# Patient Record
Sex: MALE | Race: WHITE | Employment: FULL TIME | URBAN - METROPOLITAN AREA
[De-identification: names, ages, dates, MRNs, and addresses within clinical notes are randomized per-mention and may not be internally consistent; named-entity substitution may affect disease eponyms.]

---

## 2023-05-08 NOTE — H&P
Patient seen and examined. No interim changes in H&P. Plan PCI  Cx. Risks, benefits and alternatives (including but not limited to death, MI, CVA, bleeding, RADHA, medical management) discussed. Questions answered. Patient expresses understanding and wishes to proceed.

## 2023-05-11 RX ORDER — RANOLAZINE 500 MG/1
500 TABLET, EXTENDED RELEASE ORAL 2 TIMES DAILY
COMMUNITY

## 2023-05-11 RX ORDER — FLUTICASONE FUROATE, UMECLIDINIUM BROMIDE AND VILANTEROL TRIFENATATE 200; 62.5; 25 UG/1; UG/1; UG/1
1 POWDER RESPIRATORY (INHALATION) DAILY
COMMUNITY

## 2023-05-11 RX ORDER — TAMSULOSIN HYDROCHLORIDE 0.4 MG/1
0.4 CAPSULE ORAL DAILY
COMMUNITY

## 2023-05-11 RX ORDER — CLOPIDOGREL BISULFATE 75 MG/1
75 TABLET ORAL DAILY
COMMUNITY

## 2023-05-11 RX ORDER — METOPROLOL SUCCINATE 25 MG/1
25 TABLET, EXTENDED RELEASE ORAL DAILY
COMMUNITY

## 2023-05-11 RX ORDER — GUAIFENESIN 600 MG/1
1200 TABLET, EXTENDED RELEASE ORAL 2 TIMES DAILY PRN
COMMUNITY

## 2023-05-11 RX ORDER — ESOMEPRAZOLE MAGNESIUM 40 MG/1
40 CAPSULE, DELAYED RELEASE ORAL
COMMUNITY

## 2023-05-11 RX ORDER — MELATONIN
325
COMMUNITY

## 2023-05-11 RX ORDER — ATORVASTATIN CALCIUM 80 MG/1
80 TABLET, FILM COATED ORAL NIGHTLY
COMMUNITY

## 2023-05-11 RX ORDER — VARENICLINE TARTRATE 1 MG/1
1 TABLET, FILM COATED ORAL 2 TIMES DAILY
COMMUNITY

## 2023-05-12 ENCOUNTER — APPOINTMENT (OUTPATIENT)
Dept: GENERAL RADIOLOGY | Facility: HOSPITAL | Age: 58
End: 2023-05-12
Attending: INTERNAL MEDICINE
Payer: COMMERCIAL

## 2023-05-12 ENCOUNTER — HOSPITAL ENCOUNTER (OUTPATIENT)
Dept: INTERVENTIONAL RADIOLOGY/VASCULAR | Facility: HOSPITAL | Age: 58
Discharge: HOME | End: 2023-05-12
Attending: INTERNAL MEDICINE | Admitting: INTERNAL MEDICINE
Payer: COMMERCIAL

## 2023-05-12 VITALS
BODY MASS INDEX: 27.28 KG/M2 | WEIGHT: 180 LBS | HEIGHT: 68 IN | HEART RATE: 80 BPM | SYSTOLIC BLOOD PRESSURE: 106 MMHG | RESPIRATION RATE: 18 BRPM | DIASTOLIC BLOOD PRESSURE: 66 MMHG | OXYGEN SATURATION: 97 %

## 2023-05-12 DIAGNOSIS — I25.10 CHRONIC TOTAL OCCLUSION OF NATIVE CORONARY ARTERY: ICD-10-CM

## 2023-05-12 DIAGNOSIS — I25.82 CHRONIC TOTAL OCCLUSION OF NATIVE CORONARY ARTERY: ICD-10-CM

## 2023-05-12 LAB — ISTAT ACTIVATED CLOTTING TIME: 323 SECONDS (ref 74–137)

## 2023-05-12 PROCEDURE — 92979 ENDOLUMINL IVUS OCT C EA: CPT | Performed by: INTERNAL MEDICINE

## 2023-05-12 PROCEDURE — 93010 ELECTROCARDIOGRAM REPORT: CPT | Performed by: INTERNAL MEDICINE

## 2023-05-12 PROCEDURE — 71045 X-RAY EXAM CHEST 1 VIEW: CPT | Performed by: INTERNAL MEDICINE

## 2023-05-12 PROCEDURE — 99153 MOD SED SAME PHYS/QHP EA: CPT | Performed by: INTERNAL MEDICINE

## 2023-05-12 PROCEDURE — 92978 ENDOLUMINL IVUS OCT C 1ST: CPT | Performed by: INTERNAL MEDICINE

## 2023-05-12 PROCEDURE — 02703EZ DILATION OF CORONARY ARTERY, ONE ARTERY WITH TWO INTRALUMINAL DEVICES, PERCUTANEOUS APPROACH: ICD-10-PCS | Performed by: INTERNAL MEDICINE

## 2023-05-12 PROCEDURE — 85347 COAGULATION TIME ACTIVATED: CPT

## 2023-05-12 PROCEDURE — 99152 MOD SED SAME PHYS/QHP 5/>YRS: CPT | Performed by: INTERNAL MEDICINE

## 2023-05-12 PROCEDURE — 99211 OFF/OP EST MAY X REQ PHY/QHP: CPT

## 2023-05-12 PROCEDURE — B241ZZ3 ULTRASONOGRAPHY OF MULTIPLE CORONARY ARTERIES, INTRAVASCULAR: ICD-10-PCS | Performed by: INTERNAL MEDICINE

## 2023-05-12 PROCEDURE — 93005 ELECTROCARDIOGRAM TRACING: CPT

## 2023-05-12 RX ORDER — ASPIRIN 81 MG/1
81 TABLET ORAL DAILY
Status: DISCONTINUED | OUTPATIENT
Start: 2023-05-13 | End: 2023-05-12

## 2023-05-12 RX ORDER — CLOPIDOGREL BISULFATE 75 MG/1
75 TABLET ORAL DAILY
Status: DISCONTINUED | OUTPATIENT
Start: 2023-05-13 | End: 2023-05-12

## 2023-05-12 RX ORDER — HEPARIN SODIUM 5000 [USP'U]/ML
INJECTION, SOLUTION INTRAVENOUS; SUBCUTANEOUS
Status: COMPLETED
Start: 2023-05-12 | End: 2023-05-12

## 2023-05-12 RX ORDER — LIDOCAINE HYDROCHLORIDE 10 MG/ML
INJECTION, SOLUTION EPIDURAL; INFILTRATION; INTRACAUDAL; PERINEURAL
Status: COMPLETED
Start: 2023-05-12 | End: 2023-05-12

## 2023-05-12 RX ORDER — IODIXANOL 320 MG/ML
100 INJECTION, SOLUTION INTRAVASCULAR
Status: DISCONTINUED | OUTPATIENT
Start: 2023-05-12 | End: 2023-05-12

## 2023-05-12 RX ORDER — ASPIRIN 81 MG/1
TABLET, CHEWABLE ORAL
Status: COMPLETED
Start: 2023-05-12 | End: 2023-05-12

## 2023-05-12 RX ORDER — SODIUM CHLORIDE 9 MG/ML
500 INJECTION, SOLUTION INTRAVENOUS
Status: DISCONTINUED | OUTPATIENT
Start: 2023-05-13 | End: 2023-05-12 | Stop reason: HOSPADM

## 2023-05-12 RX ORDER — IODIXANOL 320 MG/ML
100 INJECTION, SOLUTION INTRAVASCULAR
Status: COMPLETED | OUTPATIENT
Start: 2023-05-12 | End: 2023-05-12

## 2023-05-12 RX ORDER — MIDAZOLAM HYDROCHLORIDE 1 MG/ML
INJECTION INTRAMUSCULAR; INTRAVENOUS
Status: COMPLETED
Start: 2023-05-12 | End: 2023-05-12

## 2023-05-12 RX ORDER — SODIUM CHLORIDE 9 MG/ML
INJECTION, SOLUTION INTRAVENOUS CONTINUOUS
Status: DISCONTINUED | OUTPATIENT
Start: 2023-05-12 | End: 2023-05-12

## 2023-05-12 RX ORDER — ASPIRIN 81 MG/1
81 TABLET, CHEWABLE ORAL ONCE
Status: COMPLETED | OUTPATIENT
Start: 2023-05-12 | End: 2023-05-12

## 2023-05-12 RX ADMIN — ASPIRIN 81 MG: 81 TABLET, CHEWABLE ORAL at 09:45:00

## 2023-05-12 RX ADMIN — IODIXANOL 100 ML: 320 INJECTION, SOLUTION INTRAVASCULAR at 13:17:00

## 2023-05-12 RX ADMIN — SODIUM CHLORIDE: 9 INJECTION, SOLUTION INTRAVENOUS at 14:00:00

## 2023-05-12 NOTE — PLAN OF CARE
Patient had PCI today with Dr. Aly Oadebby. Bilateral groin sites CDI, soft, no hematoma, +1 pedal pulses. Patient denies any pain. VSS. Patient's niece @ bedside. Dr. Aly Oar @ bedside post procedure. Ekg completed. Patient tolerating po intake. Bedrest completed. Patient ambulated to BR and in halls, tolerated well. Groins stable. Cardiac rehab saw patient. Discharge instructions reviewed. IV's D/C'd. Patient discharged to [facility] by wheelchair with belongings. Patient's niece is .

## 2023-05-12 NOTE — CARDIAC REHAB
Cardiac rehab education completed with patient and family  Stent card given  Patient referred to cardiac rehab  Patient to attend in [facility] near home--he has contact information

## 2023-05-12 NOTE — DISCHARGE INSTRUCTIONS
Resume your Xarelto tomorrow morning, 5/13/23. See post cardiac angiogram discharge home care instructions. If you experience any chest pain or bleeding, call 911, otherwise, if you have any other concerns before 8:00 a.m. 24 hours after your procedure call [phone]. If after 8:00 a.m. call your cardiologist who did your procedure.

## 2023-05-12 NOTE — PROCEDURES
[address]    PATIENT'S NAME: Michaela Spurling PHYSICIAN: Aye Lizarraga MD   OPERATING PHYSICIAN: Aye Lizarraga MD   PATIENT ACCOUNT#:   [de-identified]    LOCATION:  [facility]  MEDICAL RECORD #:   [id number]       YOB: 1965  ADMISSION DATE:       05/12/2023      OPERATION DATE:  05/12/2023    CARDIAC PROCEDURE TRANSCRIPTION      PERCUTANEOUS CORONARY INTERVENTION     PREOPERATIVE DIAGNOSIS:    1. Coronary artery disease. 2.   Angina. POSTOPERATIVE DIAGNOSIS:    1. Coronary artery disease. 2.   Angina. PROCEDURE PERFORMED:    1. Percutaneous transluminal coronary angioplasty with drug-eluting stent x2 of the first obtuse marginal.  2.   Intravascular ultrasound of the obtuse marginal.    3.   Intravascular ultrasound of the left main. PROCEDURAL DETAILS:  After obtaining informed consent, we obtained access in the right femoral artery using a micropuncture technique. We then obtained access in the left femoral artery using micropuncture technique. An EBU3.75 guide was advanced over a wire and used to engage the left main. The first obtuse marginal was wired with a Errol blue, followed by Corsair catheter. We attempted to cross with a Errol black which was unsuccessful. A  200 crossed the lesion into the true lumen distally. A Corsair catheter was advanced. The  200 wire was removed and replaced with a Errol blue. Corsair catheter was trapped out. We predilated with a 2.5 balloon, followed by intravascular ultrasound. We also did intravascular ultrasound of the left main because there was some dampening of the guide. Intravascular ultrasound of the left main showed an area well over 6 sq mm, and therefore, no further intervention was done on the left main. The first obtuse marginal was then stented with 2.5 x 38 and 3.0 x 18 Andrew drug-eluting stents. This was postdilated with a 3.0 noncompliant balloon.   Final angiography showed no residual stenosis and NICKOLAS 3 flow. Angiography was performed of the YENNY, which showed no other collateral flow to the circumflex system. Angiography of the right coronary artery showed a dominant vessel that had no significant collaterals to the obtuse marginal system. All guides were removed. Perclose device was deployed in the right femoral artery for hemostasis, Perclose device was deployed in the left femoral artery for hemostasis, and the patient was transferred to Recovery in stable condition. COMPLICATIONS:  None. ESTIMATED BLOOD LOSS:  5 mL. CONCLUSIONS:    1. Successful angioplasty of the first  obtuse marginal with reduction in stenosis from 99% to 0% and improvement of flow from NICKOLAS 2 to NICKOLAS 3 with IVUS guidance. 2.   Intravascular ultrasound of the left main showing a nonsignificant lesion. 3.   Right dominant coronary tree with minimal disease in the right coronary artery and widely patent stents. 4.   Widely patent LIMA to the occluded left anterior descending.     Dictated By Carrington Ganser, MD  d: 05/12/2023 13:23:05  t: 05/12/2023 16:34:04  [facility] [id number]  /

## 2023-05-13 LAB
ATRIAL RATE: 73 BPM
P AXIS: 60 DEGREES
P-R INTERVAL: 196 MS
Q-T INTERVAL: 418 MS
QRS DURATION: 104 MS
QTC CALCULATION (BEZET): 460 MS
R AXIS: 26 DEGREES
T AXIS: -7 DEGREES
VENTRICULAR RATE: 73 BPM